# Patient Record
Sex: MALE | Race: BLACK OR AFRICAN AMERICAN | Employment: UNEMPLOYED | URBAN - METROPOLITAN AREA
[De-identification: names, ages, dates, MRNs, and addresses within clinical notes are randomized per-mention and may not be internally consistent; named-entity substitution may affect disease eponyms.]

---

## 2020-08-27 DIAGNOSIS — M79.605 PAIN OF LEFT LOWER EXTREMITY: ICD-10-CM

## 2020-08-27 PROBLEM — E55.9 VITAMIN D DEFICIENCY: Status: ACTIVE | Noted: 2020-08-27

## 2020-08-27 PROBLEM — M79.606 PAIN IN LOWER LIMB: Status: ACTIVE | Noted: 2020-08-27

## 2020-08-27 PROBLEM — I25.2 HISTORY OF MI (MYOCARDIAL INFARCTION): Status: ACTIVE | Noted: 2020-08-27

## 2020-08-27 PROBLEM — I10 HTN (HYPERTENSION): Status: ACTIVE | Noted: 2020-08-27

## 2020-08-27 PROBLEM — R74.8 ELEVATED LIVER ENZYMES: Status: ACTIVE | Noted: 2020-08-27

## 2020-08-27 PROBLEM — Q61.2 AUTOSOMAL DOMINANT POLYCYSTIC KIDNEY DISEASE: Status: ACTIVE | Noted: 2020-08-27

## 2020-08-27 PROBLEM — Z86.73 HISTORY OF CVA (CEREBROVASCULAR ACCIDENT): Status: ACTIVE | Noted: 2020-08-27

## 2020-08-28 ENCOUNTER — OFFICE VISIT (OUTPATIENT)
Dept: GASTROENTEROLOGY | Age: 40
End: 2020-08-28
Payer: MEDICAID

## 2020-08-28 VITALS
WEIGHT: 258 LBS | HEIGHT: 76 IN | RESPIRATION RATE: 16 BRPM | TEMPERATURE: 97.4 F | BODY MASS INDEX: 31.42 KG/M2 | SYSTOLIC BLOOD PRESSURE: 120 MMHG | OXYGEN SATURATION: 97 % | HEART RATE: 82 BPM | DIASTOLIC BLOOD PRESSURE: 86 MMHG

## 2020-08-28 DIAGNOSIS — R74.8 ELEVATED LIVER ENZYMES: Primary | ICD-10-CM

## 2020-08-28 DIAGNOSIS — Z86.73 HISTORY OF CVA (CEREBROVASCULAR ACCIDENT): ICD-10-CM

## 2020-08-28 DIAGNOSIS — Q61.3 POLYCYSTIC KIDNEY DISEASE: ICD-10-CM

## 2020-08-28 PROCEDURE — 99203 OFFICE O/P NEW LOW 30 MIN: CPT | Performed by: INTERNAL MEDICINE
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
## 2020-09-01 LAB
ALBUMIN SERPL-MCNC: 4.4 G/DL (ref 4–5)
ALP SERPL-CCNC: 50 IU/L (ref 39–117)
ALT SERPL-CCNC: 25 IU/L (ref 0–44)
AST SERPL-CCNC: 31 IU/L (ref 0–40)
BILIRUB DIRECT SERPL-MCNC: 0.18 MG/DL (ref 0–0.4)
BILIRUB SERPL-MCNC: 0.6 MG/DL (ref 0–1.2)
PROT SERPL-MCNC: 6.4 G/DL (ref 6–8.5)

## 2020-09-03 NOTE — PROGRESS NOTES
Tell patient that his liver function tests have now returned to normal.  This suggests that the elevation was from his use of acetaminophen base medications plus use of alcohol prior to the time his previous blood was drawn.

## 2020-09-04 ENCOUNTER — TELEPHONE (OUTPATIENT)
Dept: GASTROENTEROLOGY | Age: 40
End: 2020-09-04

## 2020-09-04 DIAGNOSIS — R74.8 ELEVATED LIVER ENZYMES: ICD-10-CM

## 2020-09-04 NOTE — TELEPHONE ENCOUNTER
I called patient, after verified , explained that his test results are normal now. They were probably affected by the tylenol and alcohol, he had prior to the first test. He said ok.

## 2020-09-04 NOTE — TELEPHONE ENCOUNTER
----- Message from Landy Bahena MD sent at 9/3/2020  4:39 PM EDT -----  Tell patient that his liver function tests have now returned to normal.  This suggests that the elevation was from his use of acetaminophen base medications plus use of alcohol prior to the time his previous blood was drawn.

## 2020-12-08 ENCOUNTER — TELEPHONE (OUTPATIENT)
Dept: GASTROENTEROLOGY | Age: 40
End: 2020-12-08

## 2020-12-09 NOTE — TELEPHONE ENCOUNTER
Patient called in, says he lives in [place], it will be difficult for him to drive 1 1/2 hrs to get to [facility] for a follow up. Wants to know if he has to come in or do a virtual call. He said he is doing ok, some minor constipation. Would like to know what he can do to improve bowels, he drinks 5 16oz bottles of water a day, eats a controlled amt of protein a day, also veg. He says he wathes his diet because of kidney problems and potassium. I told him I would send message to Dr Max Botello and call him with response. He said ok.

## 2020-12-11 NOTE — TELEPHONE ENCOUNTER
I called and explained to patient, that Dr Michelle Gee said he could use Colace 100mg, once a day, may increase to bid. He said he did see a dietician for his kidneys. I explained that Dr Michelle Gee said he coud do a virtual visit for follow up when needed. He said thank you.

## 2022-03-19 PROBLEM — I25.2 HISTORY OF MI (MYOCARDIAL INFARCTION): Status: ACTIVE | Noted: 2020-08-27

## 2022-03-19 PROBLEM — E55.9 VITAMIN D DEFICIENCY: Status: ACTIVE | Noted: 2020-08-27

## 2022-03-19 PROBLEM — R74.8 ELEVATED LIVER ENZYMES: Status: ACTIVE | Noted: 2020-08-27

## 2022-03-19 PROBLEM — Z86.73 HISTORY OF CVA (CEREBROVASCULAR ACCIDENT): Status: ACTIVE | Noted: 2020-08-27

## 2022-03-19 PROBLEM — I10 HTN (HYPERTENSION): Status: ACTIVE | Noted: 2020-08-27

## 2022-03-20 PROBLEM — Q61.2 AUTOSOMAL DOMINANT POLYCYSTIC KIDNEY DISEASE: Status: ACTIVE | Noted: 2020-08-27

## 2022-03-20 PROBLEM — M79.606 PAIN IN LOWER LIMB: Status: ACTIVE | Noted: 2020-08-27

## 2023-05-12 RX ORDER — ERGOCALCIFEROL 1.25 MG/1
CAPSULE ORAL
COMMUNITY
Start: 2020-05-14

## 2023-05-12 RX ORDER — LOSARTAN POTASSIUM 25 MG/1
TABLET ORAL DAILY
COMMUNITY

## 2023-05-12 RX ORDER — CLOPIDOGREL BISULFATE 75 MG/1
TABLET ORAL
COMMUNITY
Start: 2020-05-14

## 2023-05-12 RX ORDER — ATORVASTATIN CALCIUM 40 MG/1
TABLET, FILM COATED ORAL
COMMUNITY
Start: 2020-05-14

## 2023-05-12 RX ORDER — ASPIRIN 81 MG/1
TABLET ORAL
COMMUNITY
Start: 2016-02-29